# Patient Record
(demographics unavailable — no encounter records)

---

## 2024-11-01 NOTE — HISTORY OF PRESENT ILLNESS
[FreeTextEntry1] : 78 year old man referred by Dr. Sheppard for persistent LUTS. He had Urolift in 2019 that did not help symptoms. He takes flomax 0.8 mg, finasteride and tadalafil. He had UDS and cystoscopy that were consistent with TOURE. His main bother is post-void dribbling and nocturia, though he is not particularly bothered by these.  IPSS 12/22/22: 25 PVR 0 cc Prostate volume by u/s: 20 cc  Cystoscopy 8/24/22 demonstrated persistent bilobar prostatic hypertrophy, TOURE and anterior urethral stricture 14 Fr. dilated to 16 Fr. with the flexiscope. He also has significant bladder hypertrophy with cellule formation.   Urodynamics 9/14/22 showed poor bladder compliance but satisfactory capacity, poor bladder contraction and TOURE. URoflow after removal of the Urodynamics catheters showed adequate emptying with poor V Max and V Ave. These tests are consistent with TOURE, as was also found on cystoscopy last visit, and poor bladder function.  4/4/23: Patient returns for followup. He is considering prostate debulking procedure since he has had not improvement in LUTS while on triple therapy. He urinates with frequency, urgency, hesitancy, occasional strain, nocturia x2-3. He also has hx of microscopic hematuria. He has neg CT in 2020 and neg cysto in 8/2022.   Prostate size: 40 cc on CT scan in 2020   IPSS: UA: trace blood PVR: 2 ml   6/15/23: S/P HoLEP. Found to have bulbar urethral stricture that was passively dilated. Admitted overnight for hypertension monitoring. Discharged POD 1 with galindo catheter.  Pathology with 7 grams benign prostate tissue  6/20/23: Presents for void trial. Doing well.   Bladder filled with 120 cc, patient able to void twice with slow stream. Mild hematuria.  Final PVR 98 cc  7/7/23: S/p HoLEP on 6/15/23. Voiding with slightly stronger stream, mild hematuria, minimal leakage, generally dry pad, mild dysuria. No fevers, chills.   Pathology with 7 g benign prostatic tissue.   IPSS 26, QOL 3, PVR 7 cc  9/13/23: Had a couple of weeks with increased urgency, but improved symptoms this week. Generally, slightly stronger stream than prior with decreased frequency/urgency, ongoing nocturia, slightly improved from prior. No fevers, chills, dysuria, hematuria, incontinence.  IPSS 18, QOL 3, PVR 0 cc  11/8/23: Continues to have gradual improvement in all symptoms. Stream a bit stronger, decreased frequency, urgency and nocturia. Slept through the night. No incontinence.   IPSS 15, QOL 3, PVR 42 cc  8/14/23: Had recent lipoma excision on the leg, during procedure they had difficulty placing catheter. Urology had to place cystoscopically, found to have bladder neck contracture. Had catheter for one day. Since then has been having more leakage, weaker stream. Recommended to have resection of bladder neck contracture by [facility] urologist. Comes for second opinion.  In the last 6 months, has noticed more difficulty with urination, weaker stream, straining.  PVR: 0 ml   9/6/24: Bladder neck contracture  10/29/24: Cystoscopy, laser incision of bladder neck contracture  11/1/24: Presents for TOV, which was successful.

## 2024-11-01 NOTE — ASSESSMENT
[FreeTextEntry1] : Assessment: Mr. ARPAN MADRID is a 78 year year old man with history of UroLIFT in 2019, now with severe LUTS, prostate volume of 40 cc, not responding to flomax 0.8 mg, finasteride and tadalafil. Cysto and UDS consistent with bladder outlet obstruction, decreased compliance with slightly decreased capacity. S/P HoLEP 6/15/23. Urethral dilation for bulbar urethral stricture performed. Discharged POD 1 with galindo catheter (admitted overnight for hypertension). Passed void trial in office POD 5. Pathology with 7 grams benign prostate tissue.  Was voiding with stronger stream, decreased frequency/urgency/nocturia that has gradually been improving. Eventually found to have bladder neck contracture during leg lipoma excision. Cystoscopy performed in office confirmed bladder neck contracture. Open prostatic fossa. S/P laser incision of bladder neck contracture 10/29/24. TOV 11/1/24 successful.  - F/U in 2 weeks for UA, IPSS, PVR

## 2024-11-20 NOTE — ASSESSMENT
[FreeTextEntry1] : Assessment: Mr. ARPAN MADRID is a 78 year year old man with history of UroLIFT in 2019, now with severe LUTS, prostate volume of 40 cc, not responding to flomax 0.8 mg, finasteride and tadalafil. Cysto and UDS consistent with bladder outlet obstruction, decreased compliance with slightly decreased capacity. S/P HoLEP 6/15/23. Urethral dilation for bulbar urethral stricture performed. Discharged POD 1 with galindo catheter (admitted overnight for hypertension). Passed void trial in office POD 5. Pathology with 7 grams benign prostate tissue.  Was voiding with stronger stream, decreased frequency/urgency/nocturia that has gradually been improving. Eventually found to have bladder neck contracture during leg lipoma excision. Cystoscopy performed in office confirmed bladder neck contracture. Open prostatic fossa. S/P laser incision of bladder neck contracture 10/29/24. TOV 11/1/24 successful.   He is urinating with moderate stream, complete emptying, bothersome urgency. We discussed monitoring the urgency over the next few months as it can continue to improve. We also discussed medication options for the urgency which he will hold off on for now.   - F/u UA, UCx   - F/U in 3 months (IPSS, UA, PVR)

## 2024-11-20 NOTE — HISTORY OF PRESENT ILLNESS
[FreeTextEntry1] : 78 year old man referred by Dr. Sheppard for persistent LUTS. He had Urolift in 2019 that did not help symptoms. He takes flomax 0.8 mg, finasteride and tadalafil. He had UDS and cystoscopy that were consistent with TOURE. His main bother is post-void dribbling and nocturia, though he is not particularly bothered by these.  IPSS 12/22/22: 25 PVR 0 cc Prostate volume by u/s: 20 cc  Cystoscopy 8/24/22 demonstrated persistent bilobar prostatic hypertrophy, TOURE and anterior urethral stricture 14 Fr. dilated to 16 Fr. with the flexiscope. He also has significant bladder hypertrophy with cellule formation.  Urodynamics 9/14/22 showed poor bladder compliance but satisfactory capacity, poor bladder contraction and TOURE. URoflow after removal of the Urodynamics catheters showed adequate emptying with poor V Max and V Ave. These tests are consistent with TOURE, as was also found on cystoscopy last visit, and poor bladder function.  4/4/23: Patient returns for followup. He is considering prostate debulking procedure since he has had not improvement in LUTS while on triple therapy. He urinates with frequency, urgency, hesitancy, occasional strain, nocturia x2-3. He also has hx of microscopic hematuria. He has neg CT in 2020 and neg cysto in 8/2022.  Prostate size: 40 cc on CT scan in 2020  IPSS: UA: trace blood PVR: 2 ml  6/15/23: S/P HoLEP. Found to have bulbar urethral stricture that was passively dilated. Admitted overnight for hypertension monitoring. Discharged POD 1 with galindo catheter.  Pathology with 7 grams benign prostate tissue  6/20/23: Presents for void trial. Doing well.  Bladder filled with 120 cc, patient able to void twice with slow stream. Mild hematuria.  Final PVR 98 cc  7/7/23: S/p HoLEP on 6/15/23. Voiding with slightly stronger stream, mild hematuria, minimal leakage, generally dry pad, mild dysuria. No fevers, chills.  Pathology with 7 g benign prostatic tissue.  IPSS 26, QOL 3, PVR 7 cc  9/13/23: Had a couple of weeks with increased urgency, but improved symptoms this week. Generally, slightly stronger stream than prior with decreased frequency/urgency, ongoing nocturia, slightly improved from prior. No fevers, chills, dysuria, hematuria, incontinence.  IPSS 18, QOL 3, PVR 0 cc  11/8/23: Continues to have gradual improvement in all symptoms. Stream a bit stronger, decreased frequency, urgency and nocturia. Slept through the night. No incontinence.  IPSS 15, QOL 3, PVR 42 cc  8/14/23: Had recent lipoma excision on the leg, during procedure they had difficulty placing catheter. Urology had to place cystoscopically, found to have bladder neck contracture. Had catheter for one day. Since then has been having more leakage, weaker stream. Recommended to have resection of bladder neck contracture by [facility] urologist. Comes for second opinion.  In the last 6 months, has noticed more difficulty with urination, weaker stream, straining.  PVR: 0 ml  9/6/24: Bladder neck contracture  10/29/24: Cystoscopy, laser incision of bladder neck contracture  11/1/24: Presents for TOV, which was successful.  11/20/24: Here for f/u. Steam is moderate, no leakage, no hematuria still with urgency. Feels constant tingly sensation that makes him feel like he has to urinate. Nocturia x0-2.   PVR: 0 ml

## 2024-11-20 NOTE — PHYSICAL EXAM
[General Appearance - Well Developed] : well developed [] : no respiratory distress [Oriented To Time, Place, And Person] : oriented to person, place, and time

## 2024-11-20 NOTE — HISTORY OF PRESENT ILLNESS
[FreeTextEntry1] : 78 year old man referred by Dr. Sheppard for persistent LUTS. He had Urolift in 2019 that did not help symptoms. He takes flomax 0.8 mg, finasteride and tadalafil. He had UDS and cystoscopy that were consistent with TOURE. His main bother is post-void dribbling and nocturia, though he is not particularly bothered by these.  IPSS 12/22/22: 25 PVR 0 cc Prostate volume by u/s: 20 cc  Cystoscopy 8/24/22 demonstrated persistent bilobar prostatic hypertrophy, TOURE and anterior urethral stricture 14 Fr. dilated to 16 Fr. with the flexiscope. He also has significant bladder hypertrophy with cellule formation.  Urodynamics 9/14/22 showed poor bladder compliance but satisfactory capacity, poor bladder contraction and TOURE. URoflow after removal of the Urodynamics catheters showed adequate emptying with poor V Max and V Ave. These tests are consistent with TOURE, as was also found on cystoscopy last visit, and poor bladder function.  4/4/23: Patient returns for followup. He is considering prostate debulking procedure since he has had not improvement in LUTS while on triple therapy. He urinates with frequency, urgency, hesitancy, occasional strain, nocturia x2-3. He also has hx of microscopic hematuria. He has neg CT in 2020 and neg cysto in 8/2022.  Prostate size: 40 cc on CT scan in 2020  IPSS: UA: trace blood PVR: 2 ml  6/15/23: S/P HoLEP. Found to have bulbar urethral stricture that was passively dilated. Admitted overnight for hypertension monitoring. Discharged POD 1 with galindo catheter.  Pathology with 7 grams benign prostate tissue  6/20/23: Presents for void trial. Doing well.  Bladder filled with 120 cc, patient able to void twice with slow stream. Mild hematuria.  Final PVR 98 cc  7/7/23: S/p HoLEP on 6/15/23. Voiding with slightly stronger stream, mild hematuria, minimal leakage, generally dry pad, mild dysuria. No fevers, chills.  Pathology with 7 g benign prostatic tissue.  IPSS 26, QOL 3, PVR 7 cc  9/13/23: Had a couple of weeks with increased urgency, but improved symptoms this week. Generally, slightly stronger stream than prior with decreased frequency/urgency, ongoing nocturia, slightly improved from prior. No fevers, chills, dysuria, hematuria, incontinence.  IPSS 18, QOL 3, PVR 0 cc  11/8/23: Continues to have gradual improvement in all symptoms. Stream a bit stronger, decreased frequency, urgency and nocturia. Slept through the night. No incontinence.  IPSS 15, QOL 3, PVR 42 cc  8/14/23: Had recent lipoma excision on the leg, during procedure they had difficulty placing catheter. Urology had to place cystoscopically, found to have bladder neck contracture. Had catheter for one day. Since then has been having more leakage, weaker stream. Recommended to have resection of bladder neck contracture by [facility] urologist. Comes for second opinion.  In the last 6 months, has noticed more difficulty with urination, weaker stream, straining.  PVR: 0 ml  9/6/24: Bladder neck contracture  10/29/24: Cystoscopy, laser incision of bladder neck contracture  11/1/24: Presents for TOV, which was successful.  11/20/24: Here for f/u. Steam is moderate, no leakage, no hematuria still with urgency. Feels constant tingly sensation that makes him feel like he has to urinate. Nocturia x0-2.   PVR: 0 ml

## 2025-02-12 NOTE — HISTORY OF PRESENT ILLNESS
[FreeTextEntry1] : 78 year old man referred by Dr. Sheppard for persistent LUTS. He had Urolift in 2019 that did not help symptoms. He takes flomax 0.8 mg, finasteride and tadalafil. He had UDS and cystoscopy that were consistent with TOURE. His main bother is post-void dribbling and nocturia, though he is not particularly bothered by these.  IPSS 12/22/22: 25 PVR 0 cc Prostate volume by u/s: 20 cc  Cystoscopy 8/24/22 demonstrated persistent bilobar prostatic hypertrophy, TOURE and anterior urethral stricture 14 Fr. dilated to 16 Fr. with the flexiscope. He also has significant bladder hypertrophy with cellule formation.  Urodynamics 9/14/22 showed poor bladder compliance but satisfactory capacity, poor bladder contraction and TOURE. URoflow after removal of the Urodynamics catheters showed adequate emptying with poor V Max and V Ave. These tests are consistent with TOURE, as was also found on cystoscopy last visit, and poor bladder function.  4/4/23: Patient returns for followup. He is considering prostate debulking procedure since he has had not improvement in LUTS while on triple therapy. He urinates with frequency, urgency, hesitancy, occasional strain, nocturia x2-3. He also has hx of microscopic hematuria. He has neg CT in 2020 and neg cysto in 8/2022.  Prostate size: 40 cc on CT scan in 2020  IPSS: UA: trace blood PVR: 2 ml  6/15/23: S/P HoLEP. Found to have bulbar urethral stricture that was passively dilated. Admitted overnight for hypertension monitoring. Discharged POD 1 with galindo catheter.  Pathology with 7 grams benign prostate tissue  6/20/23: Presents for void trial. Doing well.  Bladder filled with 120 cc, patient able to void twice with slow stream. Mild hematuria.  Final PVR 98 cc  7/7/23: S/p HoLEP on 6/15/23. Voiding with slightly stronger stream, mild hematuria, minimal leakage, generally dry pad, mild dysuria. No fevers, chills.  Pathology with 7 g benign prostatic tissue.  IPSS 26, QOL 3, PVR 7 cc  9/13/23: Had a couple of weeks with increased urgency, but improved symptoms this week. Generally, slightly stronger stream than prior with decreased frequency/urgency, ongoing nocturia, slightly improved from prior. No fevers, chills, dysuria, hematuria, incontinence.  IPSS 18, QOL 3, PVR 0 cc  11/8/23: Continues to have gradual improvement in all symptoms. Stream a bit stronger, decreased frequency, urgency and nocturia. Slept through the night. No incontinence.  IPSS 15, QOL 3, PVR 42 cc  8/14/23: Had recent lipoma excision on the leg, during procedure they had difficulty placing catheter. Urology had to place cystoscopically, found to have bladder neck contracture. Had catheter for one day. Since then has been having more leakage, weaker stream. Recommended to have resection of bladder neck contracture by [facility] urologist. Comes for second opinion.  In the last 6 months, has noticed more difficulty with urination, weaker stream, straining.  PVR: 0 ml  9/6/24: Bladder neck contracture  10/29/24: Cystoscopy, laser incision of bladder neck contracture  11/1/24: Presents for TOV, which was successful.  11/20/24: Here for f/u. Steam is moderate, no leakage, no hematuria still with urgency. Feels constant tingly sensation that makes him feel like he has to urinate. Nocturia x0-2.   PVR: 0 ml   2/12/25: Here for f/u. Feels stream is stronger, but not consistently. Urgency has improved somewhat, but still present. No longer having leakage or wearing pads. Nocturia 0-2.  IPSS 20, QOL 3, PVR: 0 ml

## 2025-02-12 NOTE — HISTORY OF PRESENT ILLNESS
[FreeTextEntry1] : 78 year old man referred by Dr. Sheppard for persistent LUTS. He had Urolift in 2019 that did not help symptoms. He takes flomax 0.8 mg, finasteride and tadalafil. He had UDS and cystoscopy that were consistent with TOURE. His main bother is post-void dribbling and nocturia, though he is not particularly bothered by these.  IPSS 12/22/22: 25 PVR 0 cc Prostate volume by u/s: 20 cc  Cystoscopy 8/24/22 demonstrated persistent bilobar prostatic hypertrophy, TOURE and anterior urethral stricture 14 Fr. dilated to 16 Fr. with the flexiscope. He also has significant bladder hypertrophy with cellule formation.  Urodynamics 9/14/22 showed poor bladder compliance but satisfactory capacity, poor bladder contraction and TOURE. URoflow after removal of the Urodynamics catheters showed adequate emptying with poor V Max and V Ave. These tests are consistent with TOURE, as was also found on cystoscopy last visit, and poor bladder function.  4/4/23: Patient returns for followup. He is considering prostate debulking procedure since he has had not improvement in LUTS while on triple therapy. He urinates with frequency, urgency, hesitancy, occasional strain, nocturia x2-3. He also has hx of microscopic hematuria. He has neg CT in 2020 and neg cysto in 8/2022.  Prostate size: 40 cc on CT scan in 2020  IPSS: UA: trace blood PVR: 2 ml  6/15/23: S/P HoLEP. Found to have bulbar urethral stricture that was passively dilated. Admitted overnight for hypertension monitoring. Discharged POD 1 with galindo catheter.  Pathology with 7 grams benign prostate tissue  6/20/23: Presents for void trial. Doing well.  Bladder filled with 120 cc, patient able to void twice with slow stream. Mild hematuria.  Final PVR 98 cc  7/7/23: S/p HoLEP on 6/15/23. Voiding with slightly stronger stream, mild hematuria, minimal leakage, generally dry pad, mild dysuria. No fevers, chills.  Pathology with 7 g benign prostatic tissue.  IPSS 26, QOL 3, PVR 7 cc  9/13/23: Had a couple of weeks with increased urgency, but improved symptoms this week. Generally, slightly stronger stream than prior with decreased frequency/urgency, ongoing nocturia, slightly improved from prior. No fevers, chills, dysuria, hematuria, incontinence.  IPSS 18, QOL 3, PVR 0 cc  11/8/23: Continues to have gradual improvement in all symptoms. Stream a bit stronger, decreased frequency, urgency and nocturia. Slept through the night. No incontinence.  IPSS 15, QOL 3, PVR 42 cc  8/14/23: Had recent lipoma excision on the leg, during procedure they had difficulty placing catheter. Urology had to place cystoscopically, found to have bladder neck contracture. Had catheter for one day. Since then has been having more leakage, weaker stream. Recommended to have resection of bladder neck contracture by [facility] urologist. Comes for second opinion.  In the last 6 months, has noticed more difficulty with urination, weaker stream, straining.  PVR: 0 ml  9/6/24: Bladder neck contracture  10/29/24: Cystoscopy, laser incision of bladder neck contracture  11/1/24: Presents for TOV, which was successful.  11/20/24: Here for f/u. Steam is moderate, no leakage, no hematuria still with urgency. Feels constant tingly sensation that makes him feel like he has to urinate. Nocturia x0-2.   PVR: 0 ml   2/12/25: Here for f/u. Feels stream is stronger, but not consistently. Urgency has improved somewhat, but still present. No longer having leakage or wearing pads. Nocturia 0-2.  IPSS 20, QOL 3, PVR: 0 ml

## 2025-02-12 NOTE — ASSESSMENT
[FreeTextEntry1] : Assessment: Mr. ARPAN MADRID is a 78 year year old man with history of UroLIFT in 2019, now with severe LUTS, prostate volume of 40 cc, not responding to flomax 0.8 mg, finasteride and tadalafil. Cysto and UDS consistent with bladder outlet obstruction, decreased compliance with slightly decreased capacity. S/P HoLEP 6/15/23. Urethral dilation for bulbar urethral stricture performed. Discharged POD 1 with galindo catheter (admitted overnight for hypertension). Passed void trial in office POD 5. Pathology with 7 grams benign prostate tissue.  Was voiding with stronger stream, decreased frequency/urgency/nocturia that has gradually been improving. Eventually found to have bladder neck contracture during leg lipoma excision. Cystoscopy performed in office confirmed bladder neck contracture. Open prostatic fossa. S/P laser incision of bladder neck contracture 10/29/24. TOV 11/1/24 successful.   He is urinating with moderate stream, complete emptying, bothersome urgency. Discussed trial of OAB medication. He would like to discuss with cardiologist first and let me know if interested.  - F/u UA, UCx  - Consider gemtesa or myrbetriq  - F/U in 6 months for UA, IPSS, PVR

## 2025-03-25 NOTE — REASON FOR VISIT
[Follow-up Visit ___] : a follow-up visit  for [unfilled]
[Follow-up Visit ___] : a follow-up visit  for [unfilled]
none